# Patient Record
Sex: FEMALE | Race: WHITE | Employment: UNEMPLOYED | URBAN - NONMETROPOLITAN AREA
[De-identification: names, ages, dates, MRNs, and addresses within clinical notes are randomized per-mention and may not be internally consistent; named-entity substitution may affect disease eponyms.]

---

## 2017-10-14 ENCOUNTER — APPOINTMENT (OUTPATIENT)
Dept: GENERAL RADIOLOGY | Age: 22
End: 2017-10-14
Payer: COMMERCIAL

## 2017-10-14 ENCOUNTER — HOSPITAL ENCOUNTER (EMERGENCY)
Age: 22
Discharge: HOME | End: 2017-10-14
Attending: EMERGENCY MEDICINE
Payer: COMMERCIAL

## 2017-10-14 VITALS
SYSTOLIC BLOOD PRESSURE: 125 MMHG | RESPIRATION RATE: 16 BRPM | OXYGEN SATURATION: 99 % | HEART RATE: 69 BPM | TEMPERATURE: 97.9 F | DIASTOLIC BLOOD PRESSURE: 89 MMHG

## 2017-10-14 DIAGNOSIS — S52.124A CLOSED NONDISPLACED FRACTURE OF HEAD OF RIGHT RADIUS, INITIAL ENCOUNTER: Primary | ICD-10-CM

## 2017-10-14 DIAGNOSIS — S52.125A CLOSED NONDISPLACED FRACTURE OF HEAD OF LEFT RADIUS, INITIAL ENCOUNTER: ICD-10-CM

## 2017-10-14 PROCEDURE — 73080 X-RAY EXAM OF ELBOW: CPT

## 2017-10-14 PROCEDURE — 99283 EMERGENCY DEPT VISIT LOW MDM: CPT

## 2017-10-14 RX ORDER — IBUPROFEN 400 MG/1
400 TABLET ORAL EVERY 6 HOURS PRN
COMMUNITY

## 2017-10-14 ASSESSMENT — ENCOUNTER SYMPTOMS
NAUSEA: 0
BACK PAIN: 0
EYE DISCHARGE: 0
SHORTNESS OF BREATH: 0
VOMITING: 0
EYE REDNESS: 0
WHEEZING: 0
BLOOD IN STOOL: 0
CONSTIPATION: 0
DIARRHEA: 0
CHEST TIGHTNESS: 0
RHINORRHEA: 0
SORE THROAT: 0
COUGH: 0
ABDOMINAL PAIN: 0

## 2017-10-14 ASSESSMENT — PAIN SCALES - GENERAL
PAINLEVEL_OUTOF10: 0
PAINLEVEL_OUTOF10: 6

## 2017-10-14 ASSESSMENT — PAIN DESCRIPTION - PAIN TYPE: TYPE: ACUTE PAIN

## 2017-10-14 ASSESSMENT — PAIN DESCRIPTION - ORIENTATION: ORIENTATION: LEFT;RIGHT

## 2017-10-14 ASSESSMENT — PAIN DESCRIPTION - LOCATION: LOCATION: ARM

## 2017-10-14 NOTE — ED PROVIDER NOTES
Musculoskeletal: Positive for arthralgias. Negative for back pain and myalgias. Skin: Negative for pallor and rash. Allergic/Immunologic: Negative for food allergies and immunocompromised state. Neurological: Negative for dizziness, syncope, weakness and light-headedness. Hematological: Negative for adenopathy. Does not bruise/bleed easily. Psychiatric/Behavioral: Negative for behavioral problems and suicidal ideas. The patient is not nervous/anxious. Except as noted above the remainder of the review of systems was reviewed and negative. PAST MEDICAL HISTORY   History reviewed. No pertinent past medical history. SURGICAL HISTORY     History reviewed. No pertinent surgical history. CURRENT MEDICATIONS       Previous Medications    IBUPROFEN (ADVIL;MOTRIN) 400 MG TABLET    Take 400 mg by mouth every 6 hours as needed for Pain       ALLERGIES     Review of patient's allergies indicates no known allergies. FAMILY HISTORY     History reviewed. No pertinent family history. SOCIAL HISTORY       Social History     Social History    Marital status: Single     Spouse name: N/A    Number of children: N/A    Years of education: N/A     Social History Main Topics    Smoking status: Never Smoker    Smokeless tobacco: Never Used    Alcohol use No    Drug use: No    Sexual activity: Not Asked     Other Topics Concern    None     Social History Narrative    None       SCREENINGS             PHYSICAL EXAM    (up to 7 for level 4, 8 or more for level 5)     ED Triage Vitals [10/14/17 1725]   BP Temp Temp Source Pulse Resp SpO2 Height Weight   (!) 145/89 97.9 °F (36.6 °C) Tympanic 85 20 98 % -- --       Physical Exam   Constitutional: She is oriented to person, place, and time. She appears well-developed and well-nourished. No distress. HENT:   Head: Normocephalic and atraumatic.    Right Ear: External ear normal.   Left Ear: External ear normal.   Eyes: Conjunctivae are normal. Impression:     Suspected nondisplaced fracture of the radial head. Electronically Signed By: Page Zhou   on  10/14/2017  18:45            ED BEDSIDE ULTRASOUND:   Performed by ED Physician - none    LABS:  Labs Reviewed - No data to display    All other labs were within normal range or not returned as of this dictation. EMERGENCY DEPARTMENT COURSE and DIFFERENTIAL DIAGNOSIS/MDM:   Vitals:    Vitals:    10/14/17 1725   BP: (!) 145/89   Pulse: 85   Resp: 20   Temp: 97.9 °F (36.6 °C)   TempSrc: Tympanic   SpO2: 98%         MDM  Gildardo Sommers is a 25 y.o. female who presents to the emergency department s/p fall; we will order x-rays to rule out acute fracture, subluxation or other bony abnormality. Patient is resting comfortably at this time and in no distress. I have updated patient on current results. We discussed at length the patient's diagnosis. Patient understands to follow up with primary care provider in the next 2 days. Patient verbalized understanding of this. We went over medications. Strict return warnings were given. Patient will return to the emergency room as needed with new or worsening complaints. She has been placed in splints. She's been given orthopedic follow-up understands follow up on Monday. She tells me she is going out of town but I told her that and it is in her best interest to see orthopedic before her travels. Procedures    FINAL IMPRESSION      1. Closed nondisplaced fracture of head of right radius, initial encounter    2. Closed nondisplaced fracture of head of left radius, initial encounter          DISPOSITION/PLAN   DISPOSITION Decision to Discharge    PATIENT REFERRED TO:  Tico Woody  [address]  [phone]    Schedule an appointment as soon as possible for a visit in 2 days      Kimberly Adkins MD  [address] [place] [address]  [phone]      call monday to arrange follow up first of next week with orthopedic physician      DISCHARGE MEDICATIONS:  New Prescriptions    No medications on file              Summation      Patient Course:      ED Medications administered this visit:  Medications - No data to display    New Prescriptions from this visit:    New Prescriptions    No medications on file       Follow-up:  Shira Chen  [phone] [id number]    Schedule an appointment as soon as possible for a visit in 2 days      America Best MD  [address] [place] [address]  [phone]      call monday to arrange follow up first of next week with orthopedic physician        Final Impression:   1. Closed nondisplaced fracture of head of right radius, initial encounter    2.  Closed nondisplaced fracture of head of left radius, initial encounter               (Please note that portions of this note were completed with a voice recognition program.  Efforts were made to edit the dictations but occasionally words are mis-transcribed.)            Sandra Boyd PA-C  10/14/17 1900

## 2018-02-05 NOTE — ED PROVIDER NOTES
[facility] ED  Emergency Department  Faculty Attestation     Primary Care Physician  Neisha Ogden    I performed a history and physical examination of the patient and discussed management with the resident. I reviewed the residents note and agree with the documented findings including all diagnostic interpretations and plan of care. Any areas of disagreement are noted on the chart. I was personally present for the key portions of any procedures. I have documented in the chart those procedures where I was not present during the key portions. I have reviewed the emergency nurses triage note. I agree with the chief complaint, past medical history, past surgical history, allergies, medications, social and family history as documented unless otherwise noted below. For Physician Assistant/ Nurse Practitioner cases/documentation I have personally evaluated this patient and have completed at least one if not all key elements of the E/M (history, physical exam, and MDM). Additional findings are as noted. PERTINENT ATTENDING PHYSICIAN COMMENTS:    HISTORY:   Roseanne Fleming is a 25 y.o. female who  has no past medical history on file. and presents with complaint of tripped yesterday going up the steps.   Recently out of sling for bilateral fracture    PHYSICAL:   Temp: 97.9 °F (36.6 °C),  Pulse: 85, Resp: 20, BP: (!) 145/89, SpO2: 98 %  Left proximal forearm tenderness    IMPRESSION:   Arm pain    PLAN:   Xray, pain control      Rylee Sylvester MD, Nuria Tyler  Attending Emergency  Physician    (Please note that portions of this note were completed with a voice recognition program.  Efforts were made to edit the dictations but occasionally words are mis-transcribed.)        Rylee Sylvester MD  10/14/17 0538 Penicillin allergy Chronic diastolic congestive heart failure

## 2023-06-02 ENCOUNTER — HOSPITAL ENCOUNTER (OUTPATIENT)
Age: 28
Discharge: HOME | End: 2023-06-02
Payer: COMMERCIAL

## 2023-06-02 VITALS — SYSTOLIC BLOOD PRESSURE: 123 MMHG | OXYGEN SATURATION: 100 % | HEART RATE: 75 BPM | DIASTOLIC BLOOD PRESSURE: 78 MMHG

## 2023-06-02 VITALS — BODY MASS INDEX: 26.6 KG/M2

## 2023-06-02 DIAGNOSIS — E04.1: Primary | ICD-10-CM

## 2023-06-02 PROCEDURE — 10005 FNA BX W/US GDN 1ST LES: CPT

## 2023-06-02 PROCEDURE — 88108 CYTOPATH CONCENTRATE TECH: CPT

## 2023-06-02 PROCEDURE — 88160 CYTOPATH SMEAR OTHER SOURCE: CPT

## 2023-06-02 PROCEDURE — 88173 CYTOPATH EVAL FNA REPORT: CPT

## 2023-06-02 NOTE — US_ITS
[address] [address] 
     [phone] 
  
  
Patient Name: 
ROSALINA CALVIN 
  
MRN: [id number]    YOB: 1995    Sex: F 
Assigned Patient Location: [place] 
Current Patient Location: [place] 
Accession/Order Number: [id number] 
Exam Date: 6/02/2023  13:30    Report Date: 6/02/2023  14:45 
  
At the request of: 
KELLY GARDNER   
  
Procedure:  US biopsy thyroid 
  
EXAMINATION: US biopsy thyroid  
  
HISTORY: Right thyroid mass 
  
COMPARISON: No relevant comparison available.  
  
TECHNIQUE: After obtaining informed consent, ultrasound-guided fine needle  
aspiration was performed in the usual sterile manner. 
  
FINDINGS: 
IMAGING: Ultrasound. 
BIOPSY NEEDLE: 25-gauge; 3 separate passes  
LOCATION: Right lobe 3.6 cm heterogeneous mass.  
SPECIMEN TYPE: Cellular tissue. 
LOCAL ANESTHETIC: Buffered Xylocaine. 
COMPLICATIONS: None.  
LABORATORY: Prepared slide smears and washings for cell block evaluation. 
OTHER: Negative. 
PATHOLOGY: Pending. An addendum will be added when results are available.  
  
IMPRESSION:  
  
1. Uneventful ultrasound guided fine needle aspiration (FNA). 
2. Pathology results are pending.  
  
  
Electronically authenticated by: FLACA GALLEGOS   Date: 6/02/2023  14:45